# Patient Record
Sex: MALE | Race: WHITE | NOT HISPANIC OR LATINO | Employment: FULL TIME | URBAN - METROPOLITAN AREA
[De-identification: names, ages, dates, MRNs, and addresses within clinical notes are randomized per-mention and may not be internally consistent; named-entity substitution may affect disease eponyms.]

---

## 2018-07-23 ENCOUNTER — OFFICE VISIT (OUTPATIENT)
Dept: URGENT CARE | Facility: CLINIC | Age: 38
End: 2018-07-23
Payer: COMMERCIAL

## 2018-07-23 VITALS
HEART RATE: 95 BPM | TEMPERATURE: 98 F | SYSTOLIC BLOOD PRESSURE: 128 MMHG | OXYGEN SATURATION: 97 % | BODY MASS INDEX: 34.46 KG/M2 | RESPIRATION RATE: 18 BRPM | WEIGHT: 260 LBS | HEIGHT: 73 IN | DIASTOLIC BLOOD PRESSURE: 64 MMHG

## 2018-07-23 DIAGNOSIS — R51.9 HEADACHE, UNSPECIFIED HEADACHE TYPE: Primary | ICD-10-CM

## 2018-07-23 PROCEDURE — 3008F BODY MASS INDEX DOCD: CPT | Mod: CPTII,S$GLB,, | Performed by: NURSE PRACTITIONER

## 2018-07-23 PROCEDURE — 96372 THER/PROPH/DIAG INJ SC/IM: CPT | Mod: S$GLB,,, | Performed by: NURSE PRACTITIONER

## 2018-07-23 PROCEDURE — 99203 OFFICE O/P NEW LOW 30 MIN: CPT | Mod: 25,S$GLB,, | Performed by: NURSE PRACTITIONER

## 2018-07-23 RX ORDER — KETOROLAC TROMETHAMINE 30 MG/ML
30 INJECTION, SOLUTION INTRAMUSCULAR; INTRAVENOUS
Status: COMPLETED | OUTPATIENT
Start: 2018-07-23 | End: 2018-07-23

## 2018-07-23 RX ORDER — NAPROXEN 500 MG/1
500 TABLET ORAL 2 TIMES DAILY WITH MEALS
Qty: 20 TABLET | Refills: 0 | Status: SHIPPED | OUTPATIENT
Start: 2018-07-23 | End: 2018-08-02

## 2018-07-23 RX ADMIN — KETOROLAC TROMETHAMINE 30 MG: 30 INJECTION, SOLUTION INTRAMUSCULAR; INTRAVENOUS at 06:07

## 2018-07-23 NOTE — PATIENT INSTRUCTIONS
"Increase fluids.  Rest.  Naproxen as directed.  Eat with this medication.  Do not take any other otc NSAIDs with this medication.  Follow up with PCP.  A referral was placed.  Call [phone] to confirm appointment  Go to the ER for worsening symptoms.  Headache, Unspecified    A number of things can cause headaches. The cause of your headache isnt clear. But it doesnt seem to be a sign of any serious illness.  You could have a tension headache or a migraine headache.  Stress can cause a tension headache. This can happen if you tense the muscles of your shoulders, neck, and scalp without knowing it. If this stress lasts long enough, you may develop a tension headache.  It is not clear why migraines occur, but certain things called" triggers" can raise the risk of having a migraine attack. Migraine triggers may include emotional stress or depression, or by hormone changes during the menstrual cycle. Other triggers include birth control pills and other medicines, alcohol or caffeine, foods with tyramine (such as aged cheese, wine), eyestrain, weather changes, missed meals, and lack of sleep or oversleeping.  Other causes of headache include:  · Viral illness with high fever  · Head injury with concussion  · Sinus, ear, or throat infection  · Dental pain and jaw joint (TMJ) pain  More serious but less common causes of headache include stroke, brain hemorrhage, brain tumor, meningitis, and encephalitis.  Home care  Follow these tips when taking care of yourself at home:  · Dont drive yourself home if you were given pain medicine for your headache. Instead, have someone else drive you home. Try to sleep when you get home. You should feel much better when you wake up.  · Apply heat to the back of your neck to ease a neck muscle spasm. Take care of a migraine headache by putting an ice pack on your forehead or at the base of your skull.  · If you have nausea or vomiting, eat a light diet until your headache eases.  · If " you have a migraine headache, use sunglasses when in the daylight or around bright indoor lighting until your symptoms get better. Bright glaring light can make this type of headache worse.  Follow-up care  Follow up with your healthcare provider, or as advised. Talk with your provider if you have frequent headaches. He or she can help figure out a treatment plan. By knowing the earliest signs of headache, and starting treatment right away, you may be able to stop the pain yourself.  When to seek medical advice  Call your healthcare provider right away if any of these occur:  · Your head pain suddenly gets worse after sexual intercourse or strenuous activity  · Your head pain doesnt get better within 24 hours  · You arent able to keep liquids down (repeated vomiting)  · Fever of 100.4ºF (38ºC) or higher, or as directed by your healthcare provider  · Stiff neck  · Extreme drowsiness, confusion, or fainting  · Dizziness or dizziness with spinning sensation (vertigo)  · Weakness in an arm or leg or one side of your face  · You have trouble talking or seeing  Date Last Reviewed: 8/1/2016  © 3854-0928 [employer]. [address] [place] [id number]. All rights reserved. This information is not intended as a substitute for professional medical care. Always follow your healthcare professional's instructions.

## 2018-07-23 NOTE — PROGRESS NOTES
"Subjective:       Patient ID: Israel Douglas is a 37 y.o. male.    Vitals:  height is 6' 1" (1.854 m) and weight is 117.9 kg (260 lb). His oral temperature is 98.2 °F (36.8 °C). His blood pressure is 128/64 and his pulse is 95. His respiration is 18 and oxygen saturation is 97%.     Chief Complaint: Headache    Patient presents with c/o temporal pain bilaterally with some eye pain x3 days, constant.  Patient states that the pain is mild.  4/10.  He has not taken anything for this.  No photophobia, visual changes, posterior head pain, neck pain, n/v.  Denies head injury.  No history of headaches.  He is a .  Here with his wife.  He does not take any medications daily.  He is not a smoker.  No jaw pain, ear pain.        Headache    This is a new problem. The current episode started in the past 7 days. The problem occurs constantly. The problem has been unchanged. The pain is located in the bilateral region. The pain does not radiate. The quality of the pain is described as aching. The pain is at a severity of 3/10. The pain is mild. Pertinent negatives include no abdominal pain, abnormal behavior, anorexia, back pain, blurred vision, coughing, dizziness, drainage, ear pain, eye pain, eye redness, eye watering, facial sweating, fever, hearing loss, insomnia, loss of balance, muscle aches, nausea, neck pain, numbness, phonophobia, photophobia, rhinorrhea, scalp tenderness, seizures, sinus pressure, sore throat, swollen glands, tingling, tinnitus, visual change, vomiting, weakness or weight loss. He has tried nothing for the symptoms. There is no history of cancer, cluster headaches, hypertension, immunosuppression, migraine headaches, pseudotumor cerebri, recent head traumas, sinus disease or TMJ.     Review of Systems   Constitution: Negative for chills, fever, weakness and weight loss.   HENT: Negative for congestion, ear pain, hearing loss, rhinorrhea, sinus pressure, sore throat and tinnitus.    Eyes: " Negative for blurred vision, pain, photophobia and redness.   Respiratory: Negative for cough.    Skin: Negative for rash.   Musculoskeletal: Negative for back pain and neck pain.   Gastrointestinal: Negative for abdominal pain, anorexia, nausea and vomiting.   Neurological: Positive for headaches. Negative for disturbances in coordination, dizziness, loss of balance, numbness, seizures and tingling.   Psychiatric/Behavioral: Negative for altered mental status. The patient does not have insomnia and is not nervous/anxious.        Objective:      Physical Exam   Constitutional: He is oriented to person, place, and time. He appears well-developed and well-nourished. He is cooperative.  Non-toxic appearance. He does not appear ill. No distress.   HENT:   Head: Normocephalic and atraumatic.   Right Ear: Hearing, tympanic membrane, external ear and ear canal normal.   Left Ear: Hearing, tympanic membrane, external ear and ear canal normal.   Nose: Nose normal. No mucosal edema, rhinorrhea or nasal deformity. No epistaxis. Right sinus exhibits no maxillary sinus tenderness and no frontal sinus tenderness. Left sinus exhibits no maxillary sinus tenderness and no frontal sinus tenderness.   Mouth/Throat: Uvula is midline, oropharynx is clear and moist and mucous membranes are normal. No trismus in the jaw. Normal dentition. No uvula swelling. No oropharyngeal exudate, posterior oropharyngeal edema or posterior oropharyngeal erythema.   No temporal TTP    Full ROM of jaw, no pain, no clicking.   Eyes: Conjunctivae, EOM and lids are normal. Pupils are equal, round, and reactive to light. No scleral icterus.   Sclera clear bilat   Neck: Trachea normal, normal range of motion, full passive range of motion without pain and phonation normal. Neck supple. No neck rigidity.   Cardiovascular: Normal rate, regular rhythm, normal heart sounds, intact distal pulses and normal pulses.    Pulmonary/Chest: Effort normal and breath sounds  "normal. No respiratory distress.   Abdominal: Soft. Normal appearance and bowel sounds are normal. He exhibits no distension. There is no tenderness.   Musculoskeletal: Normal range of motion. He exhibits no edema or deformity.   Neurological: He is alert and oriented to person, place, and time. He has normal strength. He displays no atrophy and no tremor. No cranial nerve deficit or sensory deficit. He exhibits normal muscle tone. Coordination and gait normal.   Skin: Skin is warm, dry and intact. He is not diaphoretic. No pallor.   Psychiatric: He has a normal mood and affect. His speech is normal and behavior is normal. Judgment and thought content normal. Cognition and memory are normal.   Nursing note and vitals reviewed.      Assessment:       1. Headache, unspecified headache type        Plan:         Headache, unspecified headache type  -     ketorolac injection 30 mg; Inject 1 mL (30 mg total) into the muscle one time.  -     naproxen (NAPROSYN) 500 MG tablet; Take 1 tablet (500 mg total) by mouth 2 (two) times daily with meals. for 10 days  Dispense: 20 tablet; Refill: 0  -     Ambulatory referral to Internal Medicine      Patient Instructions   Increase fluids.  Rest.  Naproxen as directed.  Eat with this medication.  Do not take any other otc NSAIDs with this medication.  Follow up with PCP.  A referral was placed.  Call [phone] to confirm appointment  Go to the ER for worsening symptoms.  Headache, Unspecified    A number of things can cause headaches. The cause of your headache isnt clear. But it doesnt seem to be a sign of any serious illness.  You could have a tension headache or a migraine headache.  Stress can cause a tension headache. This can happen if you tense the muscles of your shoulders, neck, and scalp without knowing it. If this stress lasts long enough, you may develop a tension headache.  It is not clear why migraines occur, but certain things called" triggers" can raise the risk of " having a migraine attack. Migraine triggers may include emotional stress or depression, or by hormone changes during the menstrual cycle. Other triggers include birth control pills and other medicines, alcohol or caffeine, foods with tyramine (such as aged cheese, wine), eyestrain, weather changes, missed meals, and lack of sleep or oversleeping.  Other causes of headache include:  · Viral illness with high fever  · Head injury with concussion  · Sinus, ear, or throat infection  · Dental pain and jaw joint (TMJ) pain  More serious but less common causes of headache include stroke, brain hemorrhage, brain tumor, meningitis, and encephalitis.  Home care  Follow these tips when taking care of yourself at home:  · Dont drive yourself home if you were given pain medicine for your headache. Instead, have someone else drive you home. Try to sleep when you get home. You should feel much better when you wake up.  · Apply heat to the back of your neck to ease a neck muscle spasm. Take care of a migraine headache by putting an ice pack on your forehead or at the base of your skull.  · If you have nausea or vomiting, eat a light diet until your headache eases.  · If you have a migraine headache, use sunglasses when in the daylight or around bright indoor lighting until your symptoms get better. Bright glaring light can make this type of headache worse.  Follow-up care  Follow up with your healthcare provider, or as advised. Talk with your provider if you have frequent headaches. He or she can help figure out a treatment plan. By knowing the earliest signs of headache, and starting treatment right away, you may be able to stop the pain yourself.  When to seek medical advice  Call your healthcare provider right away if any of these occur:  · Your head pain suddenly gets worse after sexual intercourse or strenuous activity  · Your head pain doesnt get better within 24 hours  · You arent able to keep liquids down (repeated  vomiting)  · Fever of 100.4ºF (38ºC) or higher, or as directed by your healthcare provider  · Stiff neck  · Extreme drowsiness, confusion, or fainting  · Dizziness or dizziness with spinning sensation (vertigo)  · Weakness in an arm or leg or one side of your face  · You have trouble talking or seeing  Date Last Reviewed: 8/1/2016  © 2827-4207 [employer]. [address] [place] [address], [place] [id number]. All rights reserved. This information is not intended as a substitute for professional medical care. Always follow your healthcare professional's instructions.

## 2020-01-10 ENCOUNTER — OFFICE VISIT (OUTPATIENT)
Dept: URGENT CARE | Facility: CLINIC | Age: 40
End: 2020-01-10
Payer: OTHER MISCELLANEOUS

## 2020-01-10 VITALS
DIASTOLIC BLOOD PRESSURE: 74 MMHG | SYSTOLIC BLOOD PRESSURE: 131 MMHG | BODY MASS INDEX: 34.46 KG/M2 | HEART RATE: 97 BPM | HEIGHT: 73 IN | WEIGHT: 260 LBS | OXYGEN SATURATION: 100 % | RESPIRATION RATE: 18 BRPM

## 2020-01-10 DIAGNOSIS — Z00.00 PHYSICAL EXAM: Primary | ICD-10-CM

## 2020-01-10 DIAGNOSIS — Z77.090 ASBESTOS EXPOSURE: ICD-10-CM

## 2020-01-10 PROCEDURE — 99203 OFFICE O/P NEW LOW 30 MIN: CPT | Mod: S$GLB,,, | Performed by: NURSE PRACTITIONER

## 2020-01-10 PROCEDURE — 71045 X-RAY EXAM CHEST 1 VIEW: CPT | Mod: S$GLB,,, | Performed by: RADIOLOGY

## 2020-01-10 PROCEDURE — 99080 SPECIAL REPORTS OR FORMS: CPT | Mod: S$GLB,,, | Performed by: NURSE PRACTITIONER

## 2020-01-10 PROCEDURE — 99080 OSHA QUESTIONNAIRE: ICD-10-PCS | Mod: S$GLB,,, | Performed by: NURSE PRACTITIONER

## 2020-01-10 PROCEDURE — 99203 PR OFFICE/OUTPT VISIT, NEW, LEVL III, 30-44 MIN: ICD-10-PCS | Mod: S$GLB,,, | Performed by: NURSE PRACTITIONER

## 2020-01-10 PROCEDURE — 94010 BREATHING CAPACITY TEST: CPT | Mod: S$GLB,,, | Performed by: NURSE PRACTITIONER

## 2020-01-10 PROCEDURE — 76140 X-RAY CONSULTATION: CPT | Mod: S$GLB,,, | Performed by: INTERNAL MEDICINE

## 2020-01-10 PROCEDURE — 71045 XR CHEST 1 VIEW: ICD-10-PCS | Mod: S$GLB,,, | Performed by: RADIOLOGY

## 2020-01-10 PROCEDURE — 94010 PULMONARY FUNCTION SCREENING (OCC MED PHYSICALS): ICD-10-PCS | Mod: S$GLB,,, | Performed by: NURSE PRACTITIONER

## 2020-01-10 PROCEDURE — 76140 XRAY B-READING: ICD-10-PCS | Mod: S$GLB,,, | Performed by: INTERNAL MEDICINE
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
## 2020-03-21 ENCOUNTER — OFFICE VISIT (OUTPATIENT)
Dept: URGENT CARE | Facility: CLINIC | Age: 40
End: 2020-03-21
Payer: COMMERCIAL

## 2020-03-21 VITALS
TEMPERATURE: 99 F | DIASTOLIC BLOOD PRESSURE: 84 MMHG | SYSTOLIC BLOOD PRESSURE: 139 MMHG | OXYGEN SATURATION: 93 % | HEIGHT: 73 IN | RESPIRATION RATE: 18 BRPM | WEIGHT: 260 LBS | HEART RATE: 100 BPM | BODY MASS INDEX: 34.46 KG/M2

## 2020-03-21 DIAGNOSIS — J06.9 UPPER RESPIRATORY TRACT INFECTION, UNSPECIFIED TYPE: ICD-10-CM

## 2020-03-21 DIAGNOSIS — J20.9 ACUTE BRONCHITIS, UNSPECIFIED ORGANISM: Primary | ICD-10-CM

## 2020-03-21 PROCEDURE — 99213 PR OFFICE/OUTPT VISIT, EST, LEVL III, 20-29 MIN: ICD-10-PCS | Mod: S$GLB,,, | Performed by: NURSE PRACTITIONER

## 2020-03-21 PROCEDURE — 99213 OFFICE O/P EST LOW 20 MIN: CPT | Mod: S$GLB,,, | Performed by: NURSE PRACTITIONER

## 2020-03-21 RX ORDER — ALBUTEROL SULFATE 90 UG/1
2 AEROSOL, METERED RESPIRATORY (INHALATION) EVERY 6 HOURS PRN
Qty: 18 G | Refills: 0 | Status: SHIPPED | OUTPATIENT
Start: 2020-03-21 | End: 2021-03-21

## 2020-03-21 RX ORDER — AZITHROMYCIN 250 MG/1
TABLET, FILM COATED ORAL
Qty: 6 TABLET | Refills: 0 | Status: SHIPPED | OUTPATIENT
Start: 2020-03-21

## 2020-03-21 RX ORDER — METHYLPREDNISOLONE 4 MG/1
TABLET ORAL
Qty: 1 PACKAGE | Refills: 0 | Status: SHIPPED | OUTPATIENT
Start: 2020-03-21

## 2020-03-21 RX ORDER — BENZONATATE 100 MG/1
100 CAPSULE ORAL 3 TIMES DAILY PRN
Qty: 30 CAPSULE | Refills: 0 | Status: SHIPPED | OUTPATIENT
Start: 2020-03-21 | End: 2020-04-20

## 2020-03-21 NOTE — PATIENT INSTRUCTIONS
Increase Fluids and rest  Take meds as directed  If symptoms worsen or not improved follow up in office.  ER for emerg concerns or Shortness of breath    Acute Bronchitis  Your healthcare provider has told you that you have acute bronchitis. Bronchitis is infection or inflammation of the bronchial tubes (airways in the lungs). Normally, air moves easily in and out of the airways. Bronchitis narrows the airways, making it harder for air to flow in and out of the lungs. This causes symptoms such as shortness of breath, coughing up yellow or green mucus, and wheezing. Bronchitis can be acute or chronic. Acute means the condition comes on quickly and goes away in a short time, usually within 3 to 10 days. Chronic means a condition lasts a long time and often comes back.    What causes acute bronchitis?  Acute bronchitis almost always starts as a viral respiratory infection, such as a cold or the flu. Certain factors make it more likely for a cold or flu to turn into bronchitis. These include being very young, being elderly, having a heart or lung problem, or having a weak immune system. Cigarette smoking also makes bronchitis more likely.  When bronchitis develops, the airways become swollen. The airways may also become infected with bacteria. This is known as a secondary infection.  Diagnosing acute bronchitis  Your healthcare provider will examine you and ask about your symptoms and health history. You may also have a sputum culture to test the fluid in your lungs. Chest X-rays may be done to look for infection in the lungs.  Treating acute bronchitis  Bronchitis usually clears up as the cold or flu goes away. You can help feel better faster by doing the following:  · Take medicine as directed. You may be told to take ibuprofen or other over-the-counter medicines. These help relieve inflammation in your bronchial tubes. Your healthcare provider may prescribe an inhaler to help open up the bronchial tubes. Most of the  time, acute bronchitis is caused by a viral infection. Antibiotics are usually not prescribed for viral infections.  · Drink plenty of fluids, such as water, juice, or warm soup. Fluids loosen mucus so that you can cough it up. This helps you breathe more easily. Fluids also prevent dehydration.  · Make sure you get plenty of rest.  · Do not smoke. Do not allow anyone else to smoke in your home.  Recovery and follow-up  Follow up with your doctor as you are told. You will likely feel better in a week or two. But a dry cough can linger beyond that time. Let your doctor know if you still have symptoms (other than a dry cough) after 2 weeks, or if youre prone to getting bronchial infections. Take steps to protect yourself from future infections. These steps include stopping smoking and avoiding tobacco smoke, washing your hands often, and getting a yearly flu shot.  When to call your healthcare provider  Call the healthcare provider if you have any of the following:  · Fever of 100.4°F (38.0°C) or higher, or as advised  · Symptoms that get worse, or new symptoms  · Trouble breathing  · Symptoms that dont start to improve within a week, or within 3 days of taking antibiotics   Date Last Reviewed: 12/1/2016  © 2128-8670 [employer]. [address] [place] [address], [place] [address]. All rights reserved. This information is not intended as a substitute for professional medical care. Always follow your healthcare professional's instructions.    Please follow up with your Primary care provider within 2-5 days if your signs and symptoms have not resolved or worsen.     If your condition worsens or fails to improve we recommend that you receive another evaluation at the emergency room immediately or contact your primary medical clinic to discuss your concerns.   You must understand that you have received an Urgent Care treatment only and that you may be released before all of your medical problems are known or  treated. You, the patient, will arrange for follow up care as instructed.     RED FLAGS/WARNING SYMPTOMS DISCUSSED WITH PATIENT THAT WOULD WARRANT EMERGENT MEDICAL ATTENTION. PATIENT VERBALIZED UNDERSTANDING.

## 2020-10-07 ENCOUNTER — OCCUPATIONAL HEALTH (OUTPATIENT)
Dept: URGENT CARE | Facility: CLINIC | Age: 40
End: 2020-10-07

## 2020-10-07 DIAGNOSIS — Z02.1 PRE-EMPLOYMENT EXAMINATION: Primary | ICD-10-CM

## 2020-10-07 PROCEDURE — 99499 UNLISTED E&M SERVICE: CPT | Mod: S$GLB,,, | Performed by: PHYSICIAN ASSISTANT

## 2020-10-07 PROCEDURE — 80305 OOH NON-DOT DRUG SCREEN: ICD-10-PCS | Mod: S$GLB,,, | Performed by: PHYSICIAN ASSISTANT

## 2020-10-07 PROCEDURE — 99499 PHYSICAL, BASIC COMPLEXITY: ICD-10-PCS | Mod: S$GLB,,, | Performed by: PHYSICIAN ASSISTANT

## 2020-10-07 PROCEDURE — 80305 DRUG TEST PRSMV DIR OPT OBS: CPT | Mod: S$GLB,,, | Performed by: PHYSICIAN ASSISTANT

## 2020-10-07 PROCEDURE — 71046 XR CHEST PA AND LATERAL: ICD-10-PCS | Mod: FY,S$GLB,, | Performed by: RADIOLOGY

## 2020-10-07 PROCEDURE — 71046 X-RAY EXAM CHEST 2 VIEWS: CPT | Mod: FY,S$GLB,, | Performed by: RADIOLOGY

## 2022-03-17 ENCOUNTER — OFFICE VISIT (OUTPATIENT)
Dept: FAMILY MEDICINE | Facility: CLINIC | Age: 42
End: 2022-03-17
Payer: COMMERCIAL

## 2022-03-17 VITALS
BODY MASS INDEX: 36.76 KG/M2 | DIASTOLIC BLOOD PRESSURE: 78 MMHG | HEIGHT: 72 IN | WEIGHT: 271.38 LBS | SYSTOLIC BLOOD PRESSURE: 120 MMHG | HEART RATE: 80 BPM

## 2022-03-17 DIAGNOSIS — Z00.00 PHYSICAL EXAM: ICD-10-CM

## 2022-03-17 DIAGNOSIS — R68.82 LOW LIBIDO: ICD-10-CM

## 2022-03-17 DIAGNOSIS — R53.83 FATIGUE, UNSPECIFIED TYPE: ICD-10-CM

## 2022-03-17 DIAGNOSIS — Z79.899 HIGH RISK MEDICATION USE: Primary | ICD-10-CM

## 2022-03-17 PROCEDURE — 1160F RVW MEDS BY RX/DR IN RCRD: CPT | Mod: S$GLB,,, | Performed by: NURSE PRACTITIONER

## 2022-03-17 PROCEDURE — 3008F BODY MASS INDEX DOCD: CPT | Mod: S$GLB,,, | Performed by: NURSE PRACTITIONER

## 2022-03-17 PROCEDURE — 1160F PR REVIEW ALL MEDS BY PRESCRIBER/CLIN PHARMACIST DOCUMENTED: ICD-10-PCS | Mod: S$GLB,,, | Performed by: NURSE PRACTITIONER

## 2022-03-17 PROCEDURE — 99386 PREV VISIT NEW AGE 40-64: CPT | Mod: S$GLB,,, | Performed by: NURSE PRACTITIONER

## 2022-03-17 PROCEDURE — 99386 PR PREVENTIVE VISIT,NEW,40-64: ICD-10-PCS | Mod: S$GLB,,, | Performed by: NURSE PRACTITIONER

## 2022-03-17 PROCEDURE — 3008F PR BODY MASS INDEX (BMI) DOCUMENTED: ICD-10-PCS | Mod: S$GLB,,, | Performed by: NURSE PRACTITIONER

## 2022-03-17 NOTE — PROGRESS NOTES
SUBJECTIVE:    Patient ID: Israel Douglas is a 41 y.o. male.    Chief Complaint: Establish Care (No RX's, vaccine denied//dp)    41 year old male presents as new patient to establish care. Reports currently working as  in [place]. No significant medical history. Does report at some point was diagnosed with low testosterone levels. However not currently being treated. Does not take any prescription medications. Exercises several days per week. Sleeps good most days. Feels rested. No recent labs.       Office Visit on 03/17/2022   Component Date Value Ref Range Status    WBC 03/17/2022 5.2  3.8 - 10.8 Thousand/uL Final    RBC 03/17/2022 5.22  4.20 - 5.80 Million/uL Final    Hemoglobin 03/17/2022 14.5  13.2 - 17.1 g/dL Final    Hematocrit 03/17/2022 43.9  38.5 - 50.0 % Final    MCV 03/17/2022 84.1  80.0 - 100.0 fL Final    MCH 03/17/2022 27.8  27.0 - 33.0 pg Final    MCHC 03/17/2022 33.0  32.0 - 36.0 g/dL Final    RDW 03/17/2022 13.1  11.0 - 15.0 % Final    Platelets 03/17/2022 259  140 - 400 Thousand/uL Final    MPV 03/17/2022 10.2  7.5 - 12.5 fL Final    Neutrophils, Abs 03/17/2022 2,896  1,500 - 7,800 cells/uL Final    Lymph # 03/17/2022 1,700  850 - 3,900 cells/uL Final    Mono # 03/17/2022 411  200 - 950 cells/uL Final    Eos # 03/17/2022 151  15 - 500 cells/uL Final    Baso # 03/17/2022 42  0 - 200 cells/uL Final    Neutrophils Relative 03/17/2022 55.7  % Final    Lymph % 03/17/2022 32.7  % Final    Mono % 03/17/2022 7.9  % Final    Eosinophil % 03/17/2022 2.9  % Final    Basophil % 03/17/2022 0.8  % Final    Glucose 03/17/2022 115 (A) 65 - 99 mg/dL Final    BUN 03/17/2022 13  7 - 25 mg/dL Final    Creatinine 03/17/2022 1.09  0.60 - 1.35 mg/dL Final    eGFR if non  03/17/2022 84  > OR = 60 mL/min/1.73m2 Final    eGFR if  03/17/2022 97  > OR = 60 mL/min/1.73m2 Final    BUN/Creatinine Ratio 03/17/2022 NOT APPLICABLE  6 - 22 (calc) Final     Sodium 03/17/2022 141  135 - 146 mmol/L Final    Potassium 03/17/2022 4.3  3.5 - 5.3 mmol/L Final    Chloride 03/17/2022 105  98 - 110 mmol/L Final    CO2 03/17/2022 27  20 - 32 mmol/L Final    Calcium 03/17/2022 9.9  8.6 - 10.3 mg/dL Final    Total Protein 03/17/2022 7.0  6.1 - 8.1 g/dL Final    Albumin 03/17/2022 4.4  3.6 - 5.1 g/dL Final    Globulin, Total 03/17/2022 2.6  1.9 - 3.7 g/dL (calc) Final    Albumin/Globulin Ratio 03/17/2022 1.7  1.0 - 2.5 (calc) Final    Total Bilirubin 03/17/2022 0.6  0.2 - 1.2 mg/dL Final    Alkaline Phosphatase 03/17/2022 60  36 - 130 U/L Final    AST 03/17/2022 16  10 - 40 U/L Final    ALT 03/17/2022 22  9 - 46 U/L Final    TSH w/reflex to FT4 03/17/2022 0.71  0.40 - 4.50 mIU/L Final    Color, UA 03/17/2022 YELLOW  YELLOW Final    Appearance, UA 03/17/2022 CLEAR  CLEAR Final    Specific Gravity, UA 03/17/2022 1.013  1.001 - 1.035 Final    pH, UA 03/17/2022 8.0  5.0 - 8.0 Final    Glucose, UA 03/17/2022 NEGATIVE  NEGATIVE Final    Bilirubin, UA 03/17/2022 NEGATIVE  NEGATIVE Final    Ketones, UA 03/17/2022 NEGATIVE  NEGATIVE Final    Occult Blood UA 03/17/2022 NEGATIVE  NEGATIVE Final    Protein, UA 03/17/2022 NEGATIVE  NEGATIVE Final    Nitrite, UA 03/17/2022 NEGATIVE  NEGATIVE Final    Leukocytes, UA 03/17/2022 NEGATIVE  NEGATIVE Final    WBC Casts, UA 03/17/2022 NONE SEEN  < OR = 5 /HPF Final    RBC Casts, UA 03/17/2022 NONE SEEN  < OR = 2 /HPF Final    Squam Epithel, UA 03/17/2022 NONE SEEN  < OR = 5 /HPF Final    Bacteria, UA 03/17/2022 NONE SEEN  NONE SEEN /HPF Final    Hyaline Casts, UA 03/17/2022 NONE SEEN  NONE SEEN /LPF Final    Reflexive Urine Culture 03/17/2022    Final    Cholesterol 03/17/2022 164  <200 mg/dL Final    HDL 03/17/2022 46  > OR = 40 mg/dL Final    Triglycerides 03/17/2022 86  <150 mg/dL Final    LDL Cholesterol 03/17/2022 100 (A) mg/dL (calc) Final    HDL/Cholesterol Ratio 03/17/2022 3.6  <5.0 (calc) Final    Non HDL  Chol. (LDL+VLDL) 03/17/2022 118  <130 mg/dL (calc) Final    TESTOSTERONE, TOTAL, MALE 03/17/2022 313  250 - 827 ng/dL Final    Testosterone, Free 03/17/2022 50.4  46.0 - 224.0 pg/mL Final       Past Medical History:   Diagnosis Date    No known health problems      Social History     Socioeconomic History    Marital status:    Tobacco Use    Smoking status: Never Smoker    Smokeless tobacco: Never Used   Social History Narrative    -->works in [employer] about 5 days per week    Eat healthy     Past Surgical History:   Procedure Laterality Date    WISDOM TOOTH EXTRACTION       Family History   Problem Relation Age of Onset    No Known Problems Mother     No Known Problems Father        Review of patient's allergies indicates:   Allergen Reactions    Penicillins      Family allergy          Current Outpatient Medications:     albuterol (VENTOLIN HFA) 90 mcg/actuation inhaler, Inhale 2 puffs into the lungs every 6 (six) hours as needed for Wheezing or Shortness of Breath. Rescue, Disp: 18 g, Rfl: 0    azithromycin (ZITHROMAX Z-HEATHER) 250 MG tablet, Take 2 tablets (500 mg) on  Day 1,  followed by 1 tablet (250 mg) once daily on Days 2 through 5., Disp: 6 tablet, Rfl: 0    methylPREDNISolone (MEDROL DOSEPACK) 4 mg tablet, use as directed, Disp: 1 Package, Rfl: 0    Review of Systems   Constitutional: Negative for fatigue, fever and unexpected weight change.   HENT: Negative for ear pain, sinus pressure and sore throat.    Eyes: Negative for pain.   Respiratory: Negative for cough and shortness of breath.    Cardiovascular: Negative for chest pain and leg swelling.   Gastrointestinal: Negative for abdominal pain, constipation, nausea and vomiting.   Genitourinary: Negative for dysuria, frequency and urgency.   Musculoskeletal: Negative for arthralgias.   Skin: Negative for rash.   Neurological: Negative for dizziness, weakness and headaches.   Psychiatric/Behavioral: Negative for  sleep disturbance.          Objective:      Vitals:    03/17/22 1056   BP: 120/78   Pulse: 80   Weight: 123.1 kg (271 lb 6.4 oz)   Height: 6' (1.829 m)     Physical Exam  Constitutional:       General: He is not in acute distress.     Appearance: He is well-developed. He is not ill-appearing.   HENT:      Head: Normocephalic and atraumatic.      Right Ear: External ear normal.      Left Ear: External ear normal.   Eyes:      Pupils: Pupils are equal, round, and reactive to light.   Neck:      Trachea: No tracheal deviation.   Cardiovascular:      Rate and Rhythm: Normal rate and regular rhythm.      Heart sounds: No murmur heard.    No friction rub. No gallop.   Pulmonary:      Breath sounds: Normal breath sounds. No stridor. No wheezing or rales.   Abdominal:      Palpations: Abdomen is soft. There is no mass.      Tenderness: There is no abdominal tenderness.   Musculoskeletal:         General: No tenderness or deformity.      Cervical back: Neck supple.   Lymphadenopathy:      Cervical: No cervical adenopathy.   Skin:     General: Skin is warm and dry.   Neurological:      Mental Status: He is alert and oriented to person, place, and time.      Coordination: Coordination normal.   Psychiatric:         Thought Content: Thought content normal.           Assessment:       1. High risk medication use    2. Physical exam    3. Low libido    4. Fatigue, unspecified type         Plan:       High risk medication use  -     CBC Auto Differential; Future; Expected date: 03/17/2022  -     Comprehensive Metabolic Panel; Future; Expected date: 03/17/2022  -     TSH w/reflex to FT4; Future; Expected date: 03/17/2022  -     Urinalysis, Reflex to Urine Culture Urine, Clean Catch; Future; Expected date: 03/17/2022  -     Lipid Panel; Future; Expected date: 03/17/2022    Physical exam  -     CBC Auto Differential; Future; Expected date: 03/17/2022  -     Comprehensive Metabolic Panel; Future; Expected date: 03/17/2022  -     TSH  w/reflex to FT4; Future; Expected date: 03/17/2022  -     Urinalysis, Reflex to Urine Culture Urine, Clean Catch; Future; Expected date: 03/17/2022  -     Lipid Panel; Future; Expected date: 03/17/2022    Low libido  -     Testosterone, Total, Males; Future; Expected date: 03/17/2022  -     Testosterone, Free; Future; Expected date: 03/17/2022    Fatigue, unspecified type  -     Testosterone, Total, Males; Future; Expected date: 03/17/2022  -     Testosterone, Free; Future; Expected date: 03/17/2022      Follow up in about 6 months (around 9/17/2022), or if symptoms worsen or fail to improve, for medication management.        3/20/2022 Jessica Mandujano

## 2022-03-20 LAB
ALBUMIN SERPL-MCNC: 4.4 G/DL (ref 3.6–5.1)
ALBUMIN/GLOB SERPL: 1.7 (CALC) (ref 1–2.5)
ALP SERPL-CCNC: 60 U/L (ref 36–130)
ALT SERPL-CCNC: 22 U/L (ref 9–46)
APPEARANCE UR: CLEAR
AST SERPL-CCNC: 16 U/L (ref 10–40)
BACTERIA #/AREA URNS HPF: NORMAL /HPF
BACTERIA UR CULT: NORMAL
BASOPHILS # BLD AUTO: 42 CELLS/UL (ref 0–200)
BASOPHILS NFR BLD AUTO: 0.8 %
BILIRUB SERPL-MCNC: 0.6 MG/DL (ref 0.2–1.2)
BILIRUB UR QL STRIP: NEGATIVE
BUN SERPL-MCNC: 13 MG/DL (ref 7–25)
BUN/CREAT SERPL: ABNORMAL (CALC) (ref 6–22)
CALCIUM SERPL-MCNC: 9.9 MG/DL (ref 8.6–10.3)
CHLORIDE SERPL-SCNC: 105 MMOL/L (ref 98–110)
CHOLEST SERPL-MCNC: 164 MG/DL
CHOLEST/HDLC SERPL: 3.6 (CALC)
CO2 SERPL-SCNC: 27 MMOL/L (ref 20–32)
COLOR UR: YELLOW
CREAT SERPL-MCNC: 1.09 MG/DL (ref 0.6–1.35)
EOSINOPHIL # BLD AUTO: 151 CELLS/UL (ref 15–500)
EOSINOPHIL NFR BLD AUTO: 2.9 %
ERYTHROCYTE [DISTWIDTH] IN BLOOD BY AUTOMATED COUNT: 13.1 % (ref 11–15)
GLOBULIN SER CALC-MCNC: 2.6 G/DL (CALC) (ref 1.9–3.7)
GLUCOSE SERPL-MCNC: 115 MG/DL (ref 65–99)
GLUCOSE UR QL STRIP: NEGATIVE
HCT VFR BLD AUTO: 43.9 % (ref 38.5–50)
HDLC SERPL-MCNC: 46 MG/DL
HGB BLD-MCNC: 14.5 G/DL (ref 13.2–17.1)
HGB UR QL STRIP: NEGATIVE
HYALINE CASTS #/AREA URNS LPF: NORMAL /LPF
KETONES UR QL STRIP: NEGATIVE
LDLC SERPL CALC-MCNC: 100 MG/DL (CALC)
LEUKOCYTE ESTERASE UR QL STRIP: NEGATIVE
LYMPHOCYTES # BLD AUTO: 1700 CELLS/UL (ref 850–3900)
LYMPHOCYTES NFR BLD AUTO: 32.7 %
MCH RBC QN AUTO: 27.8 PG (ref 27–33)
MCHC RBC AUTO-ENTMCNC: 33 G/DL (ref 32–36)
MCV RBC AUTO: 84.1 FL (ref 80–100)
MONOCYTES # BLD AUTO: 411 CELLS/UL (ref 200–950)
MONOCYTES NFR BLD AUTO: 7.9 %
NEUTROPHILS # BLD AUTO: 2896 CELLS/UL (ref 1500–7800)
NEUTROPHILS NFR BLD AUTO: 55.7 %
NITRITE UR QL STRIP: NEGATIVE
NONHDLC SERPL-MCNC: 118 MG/DL (CALC)
PH UR STRIP: 8 [PH] (ref 5–8)
PLATELET # BLD AUTO: 259 THOUSAND/UL (ref 140–400)
PMV BLD REES-ECKER: 10.2 FL (ref 7.5–12.5)
POTASSIUM SERPL-SCNC: 4.3 MMOL/L (ref 3.5–5.3)
PROT SERPL-MCNC: 7 G/DL (ref 6.1–8.1)
PROT UR QL STRIP: NEGATIVE
RBC # BLD AUTO: 5.22 MILLION/UL (ref 4.2–5.8)
RBC #/AREA URNS HPF: NORMAL /HPF
SODIUM SERPL-SCNC: 141 MMOL/L (ref 135–146)
SP GR UR STRIP: 1.01 (ref 1–1.03)
SQUAMOUS #/AREA URNS HPF: NORMAL /HPF
TESTOST FREE SERPL-MCNC: 50.4 PG/ML (ref 46–224)
TESTOST SERPL-MCNC: 313 NG/DL (ref 250–827)
TRIGL SERPL-MCNC: 86 MG/DL
TSH SERPL-ACNC: 0.71 MIU/L (ref 0.4–4.5)
WBC # BLD AUTO: 5.2 THOUSAND/UL (ref 3.8–10.8)
WBC #/AREA URNS HPF: NORMAL /HPF

## 2022-03-21 ENCOUNTER — PATIENT MESSAGE (OUTPATIENT)
Dept: FAMILY MEDICINE | Facility: CLINIC | Age: 42
End: 2022-03-21
Payer: COMMERCIAL

## 2022-03-21 ENCOUNTER — TELEPHONE (OUTPATIENT)
Dept: FAMILY MEDICINE | Facility: CLINIC | Age: 42
End: 2022-03-21
Payer: COMMERCIAL

## 2022-03-21 NOTE — TELEPHONE ENCOUNTER
----- Message from Jessica Mandujano NP sent at 3/20/2022 10:52 PM CDT -----  Blood sugar is slightly elevated. Decrease sweets and carbohydrates in diet. The rest of your labs including your testosterone levels are within normal limits.